# Patient Record
Sex: MALE | Race: WHITE | NOT HISPANIC OR LATINO
[De-identification: names, ages, dates, MRNs, and addresses within clinical notes are randomized per-mention and may not be internally consistent; named-entity substitution may affect disease eponyms.]

---

## 2017-04-18 ENCOUNTER — APPOINTMENT (OUTPATIENT)
Dept: UROLOGY | Facility: CLINIC | Age: 82
End: 2017-04-18

## 2017-04-18 VITALS
HEART RATE: 68 BPM | RESPIRATION RATE: 15 BRPM | TEMPERATURE: 97.8 F | DIASTOLIC BLOOD PRESSURE: 68 MMHG | SYSTOLIC BLOOD PRESSURE: 137 MMHG | BODY MASS INDEX: 24.64 KG/M2 | WEIGHT: 157 LBS | HEIGHT: 67 IN

## 2017-04-20 LAB — BACTERIA UR CULT: NORMAL

## 2017-04-26 LAB
PSA FREE FLD-MCNC: 18.4 %
PSA FREE SERPL-MCNC: 0.78 NG/ML
PSA SERPL-MCNC: 4.26 NG/ML

## 2019-02-05 ENCOUNTER — APPOINTMENT (OUTPATIENT)
Dept: UROLOGY | Facility: CLINIC | Age: 84
End: 2019-02-05
Payer: MEDICARE

## 2019-02-05 VITALS
RESPIRATION RATE: 16 BRPM | SYSTOLIC BLOOD PRESSURE: 155 MMHG | DIASTOLIC BLOOD PRESSURE: 74 MMHG | HEART RATE: 65 BPM | TEMPERATURE: 98.5 F | HEIGHT: 67 IN | BODY MASS INDEX: 25.9 KG/M2 | WEIGHT: 165 LBS

## 2019-02-05 DIAGNOSIS — Z87.898 PERSONAL HISTORY OF OTHER SPECIFIED CONDITIONS: ICD-10-CM

## 2019-02-05 DIAGNOSIS — K40.90 UNILATERAL INGUINAL HERNIA, W/OUT OBSTRUCTION OR GANGRENE, NOT SPECIFIED AS RECURRENT: ICD-10-CM

## 2019-02-05 PROCEDURE — 99213 OFFICE O/P EST LOW 20 MIN: CPT

## 2019-02-25 ENCOUNTER — APPOINTMENT (OUTPATIENT)
Dept: SURGERY | Facility: CLINIC | Age: 84
End: 2019-02-25
Payer: MEDICARE

## 2019-02-25 VITALS
HEART RATE: 79 BPM | WEIGHT: 157 LBS | SYSTOLIC BLOOD PRESSURE: 134 MMHG | HEIGHT: 67 IN | DIASTOLIC BLOOD PRESSURE: 66 MMHG | BODY MASS INDEX: 24.64 KG/M2 | TEMPERATURE: 98.2 F

## 2019-02-25 PROCEDURE — 99203 OFFICE O/P NEW LOW 30 MIN: CPT

## 2019-03-18 ENCOUNTER — OUTPATIENT (OUTPATIENT)
Dept: OUTPATIENT SERVICES | Facility: HOSPITAL | Age: 84
LOS: 1 days | End: 2019-03-18
Payer: MEDICARE

## 2019-03-18 VITALS
HEIGHT: 66 IN | HEART RATE: 84 BPM | DIASTOLIC BLOOD PRESSURE: 76 MMHG | RESPIRATION RATE: 16 BRPM | SYSTOLIC BLOOD PRESSURE: 160 MMHG | TEMPERATURE: 98 F | WEIGHT: 166.01 LBS

## 2019-03-18 DIAGNOSIS — J33.9 NASAL POLYP, UNSPECIFIED: Chronic | ICD-10-CM

## 2019-03-18 DIAGNOSIS — K40.20 BILATERAL INGUINAL HERNIA, WITHOUT OBSTRUCTION OR GANGRENE, NOT SPECIFIED AS RECURRENT: ICD-10-CM

## 2019-03-18 DIAGNOSIS — I10 ESSENTIAL (PRIMARY) HYPERTENSION: ICD-10-CM

## 2019-03-18 DIAGNOSIS — I26.99 OTHER PULMONARY EMBOLISM WITHOUT ACUTE COR PULMONALE: ICD-10-CM

## 2019-03-18 DIAGNOSIS — K40.90 UNILATERAL INGUINAL HERNIA, WITHOUT OBSTRUCTION OR GANGRENE, NOT SPECIFIED AS RECURRENT: ICD-10-CM

## 2019-03-18 LAB
ANION GAP SERPL CALC-SCNC: 13 MMO/L — SIGNIFICANT CHANGE UP (ref 7–14)
BUN SERPL-MCNC: 20 MG/DL — SIGNIFICANT CHANGE UP (ref 7–23)
CALCIUM SERPL-MCNC: 9.1 MG/DL — SIGNIFICANT CHANGE UP (ref 8.4–10.5)
CHLORIDE SERPL-SCNC: 107 MMOL/L — SIGNIFICANT CHANGE UP (ref 98–107)
CO2 SERPL-SCNC: 23 MMOL/L — SIGNIFICANT CHANGE UP (ref 22–31)
CREAT SERPL-MCNC: 1.5 MG/DL — HIGH (ref 0.5–1.3)
GLUCOSE SERPL-MCNC: 125 MG/DL — HIGH (ref 70–99)
HCT VFR BLD CALC: 39.9 % — SIGNIFICANT CHANGE UP (ref 39–50)
HGB BLD-MCNC: 12.2 G/DL — LOW (ref 13–17)
MCHC RBC-ENTMCNC: 26.8 PG — LOW (ref 27–34)
MCHC RBC-ENTMCNC: 30.6 % — LOW (ref 32–36)
MCV RBC AUTO: 87.5 FL — SIGNIFICANT CHANGE UP (ref 80–100)
NRBC # FLD: 0 K/UL — LOW (ref 25–125)
PLATELET # BLD AUTO: 232 K/UL — SIGNIFICANT CHANGE UP (ref 150–400)
PMV BLD: 10.6 FL — SIGNIFICANT CHANGE UP (ref 7–13)
POTASSIUM SERPL-MCNC: 4.1 MMOL/L — SIGNIFICANT CHANGE UP (ref 3.5–5.3)
POTASSIUM SERPL-SCNC: 4.1 MMOL/L — SIGNIFICANT CHANGE UP (ref 3.5–5.3)
RBC # BLD: 4.56 M/UL — SIGNIFICANT CHANGE UP (ref 4.2–5.8)
RBC # FLD: 15 % — HIGH (ref 10.3–14.5)
SODIUM SERPL-SCNC: 143 MMOL/L — SIGNIFICANT CHANGE UP (ref 135–145)
WBC # BLD: 6.87 K/UL — SIGNIFICANT CHANGE UP (ref 3.8–10.5)
WBC # FLD AUTO: 6.87 K/UL — SIGNIFICANT CHANGE UP (ref 3.8–10.5)

## 2019-03-18 PROCEDURE — 93010 ELECTROCARDIOGRAM REPORT: CPT

## 2019-03-18 NOTE — H&P PST ADULT - NSICDXPASTMEDICALHX_GEN_ALL_CORE_FT
PAST MEDICAL HISTORY:  Benign Essential Hypertension     DVT, lower extremity right side 20 years ago    Gout     Hyperlipidemia     Inguinal hernia bilateral    Pulmonary Embolus 20 years ago    Sciatica     Snoring PAIGE precautions -- responds affirmatively to STOP BANG questionnaire

## 2019-03-18 NOTE — H&P PST ADULT - NSANTHOSAYNRD_GEN_A_CORE
No. PAIGE screening performed.  STOP BANG Legend: 0-2 = LOW Risk; 3-4 = INTERMEDIATE Risk; 5-8 = HIGH Risk

## 2019-03-18 NOTE — H&P PST ADULT - NSICDXPASTSURGICALHX_GEN_ALL_CORE_FT
PAST SURGICAL HISTORY:  S/P Repair of Ventral Hernia PAST SURGICAL HISTORY:  Nasal polyps excision of nasal polyps    S/P Repair of Ventral Hernia

## 2019-03-18 NOTE — H&P PST ADULT - HISTORY OF PRESENT ILLNESS
This is an 86 y/o male who presents with recent finding of right inguinal hernia swelling. Visited MD and dig nosed with b/l inguinal hernia. Intervention recommended. Scheduled for lap b/l inguinal hernia repair on 4-1-19

## 2019-03-18 NOTE — H&P PST ADULT - NSICDXPROBLEM_GEN_ALL_CORE_FT
PROBLEM DIAGNOSES  Problem: Inguinal hernia  Assessment and Plan: This is an 86 y/o male who is scheduled for lap b/l inguibal hernia repair on 4-1-19  * Given preop instructions and scrub cleanser  * Instructed to take normal am dose of amlodipine, lisinopril, atenolol and nexium the am of surgery    Problem: Hypertension  Assessment and Plan: Await medical evaluation with pcp due to elevated BP at PAST and taking antihypertensives PROBLEM DIAGNOSES  Problem: Hypertension  Assessment and Plan: Await medical evaluation with pcp due to elevated BP at PAST and taking antihypertensives    Problem: Inguinal hernia  Assessment and Plan: This is an 88 y/o male who is scheduled for lap b/l inguinal hernia repair on 4-1-19  * Given preop instructions and scrub cleanser  * Instructed to take normal am dose of amlodipine, lisinopril, atenolol and Nexium the am of surgery PROBLEM DIAGNOSES  Problem: Hypertension  Assessment and Plan: Await medical evaluation with pcp due to elevated BP at PAST and taking antihypertensives  * Notified Leanna in surgeon's office    Problem: Inguinal hernia  Assessment and Plan: This is an 88 y/o male who is scheduled for lap b/l inguinal hernia repair on 4-1-19  * Given preop instructions and scrub cleanser  * Instructed to take normal am dose of amlodipine, lisinopril, atenolol and Nexium the am of surgery

## 2019-03-19 PROBLEM — I82.409 ACUTE EMBOLISM AND THROMBOSIS OF UNSPECIFIED DEEP VEINS OF UNSPECIFIED LOWER EXTREMITY: Chronic | Status: ACTIVE | Noted: 2019-03-18

## 2019-03-19 PROBLEM — K40.90 UNILATERAL INGUINAL HERNIA, WITHOUT OBSTRUCTION OR GANGRENE, NOT SPECIFIED AS RECURRENT: Chronic | Status: ACTIVE | Noted: 2019-03-18

## 2019-03-19 PROBLEM — R06.83 SNORING: Chronic | Status: ACTIVE | Noted: 2019-03-18

## 2019-03-31 ENCOUNTER — TRANSCRIPTION ENCOUNTER (OUTPATIENT)
Age: 84
End: 2019-03-31

## 2019-04-01 ENCOUNTER — APPOINTMENT (OUTPATIENT)
Dept: SURGERY | Facility: AMBULATORY SURGERY CENTER | Age: 84
End: 2019-04-01

## 2019-04-01 ENCOUNTER — OUTPATIENT (OUTPATIENT)
Dept: OUTPATIENT SERVICES | Facility: HOSPITAL | Age: 84
LOS: 1 days | Discharge: HOME | End: 2019-04-01
Payer: MEDICARE

## 2019-04-01 VITALS
TEMPERATURE: 98 F | DIASTOLIC BLOOD PRESSURE: 73 MMHG | OXYGEN SATURATION: 97 % | RESPIRATION RATE: 16 BRPM | SYSTOLIC BLOOD PRESSURE: 144 MMHG | HEIGHT: 66 IN | WEIGHT: 166.01 LBS | HEART RATE: 84 BPM

## 2019-04-01 VITALS
OXYGEN SATURATION: 96 % | SYSTOLIC BLOOD PRESSURE: 135 MMHG | HEART RATE: 68 BPM | DIASTOLIC BLOOD PRESSURE: 71 MMHG | RESPIRATION RATE: 18 BRPM | TEMPERATURE: 97 F

## 2019-04-01 DIAGNOSIS — J33.9 NASAL POLYP, UNSPECIFIED: Chronic | ICD-10-CM

## 2019-04-01 DIAGNOSIS — K40.20 BILATERAL INGUINAL HERNIA, WITHOUT OBSTRUCTION OR GANGRENE, NOT SPECIFIED AS RECURRENT: ICD-10-CM

## 2019-04-01 PROCEDURE — 49650 LAP ING HERNIA REPAIR INIT: CPT | Mod: GC

## 2019-04-01 RX ORDER — OXYCODONE AND ACETAMINOPHEN 5; 325 MG/1; MG/1
1 TABLET ORAL EVERY 4 HOURS
Qty: 0 | Refills: 0 | Status: DISCONTINUED | OUTPATIENT
Start: 2019-04-01 | End: 2019-04-01

## 2019-04-01 RX ORDER — LISINOPRIL 2.5 MG/1
1 TABLET ORAL
Qty: 0 | Refills: 0 | COMMUNITY

## 2019-04-01 RX ORDER — ESOMEPRAZOLE MAGNESIUM 40 MG/1
1 CAPSULE, DELAYED RELEASE ORAL
Qty: 0 | Refills: 0 | COMMUNITY

## 2019-04-01 RX ORDER — ATENOLOL 25 MG/1
1 TABLET ORAL
Qty: 0 | Refills: 0 | COMMUNITY

## 2019-04-01 RX ORDER — AMLODIPINE BESYLATE 2.5 MG/1
1 TABLET ORAL
Qty: 0 | Refills: 0 | COMMUNITY

## 2019-04-01 NOTE — ASU DISCHARGE PLAN (ADULT/PEDIATRIC) - ASU DC SPECIAL INSTRUCTIONSFT
You may take tylenol for pain. If you have breakthrough pain despite tylenol, you may take a Percocet ( a prescription was sent to [facility] at [facility]). Do not exceed total 3g of tylenol in 1 day, and keep in mind that there is tylenol in Percocet.

## 2019-04-01 NOTE — ASU DISCHARGE PLAN (ADULT/PEDIATRIC) - CARE PROVIDER_API CALL
Julio Tuttle)  ColonRectal Surgery; Surgery  [address] [place] [address]  Phone: [phone]  Fax: [phone]  Follow Up Time:

## 2019-04-01 NOTE — ASU DISCHARGE PLAN (ADULT/PEDIATRIC) - BATHING
Shower only/OK to start showering 48 hours after surgery. No soaking in water/bath. Pat dry. Do not submerge in water/Shower only/OK to start showering 48 hours after surgery. No soaking in water/bath. Pat dry.

## 2019-04-01 NOTE — ASU DISCHARGE PLAN (ADULT/PEDIATRIC) - ACTIVITY LEVEL
No strenuous activity for 1 week. No heavy lifting for 1 month./No excercise/No heavy lifting/No sports/gym

## 2019-04-02 ENCOUNTER — OUTPATIENT (OUTPATIENT)
Dept: OUTPATIENT SERVICES | Facility: HOSPITAL | Age: 84
LOS: 1 days | End: 2019-04-02
Payer: MEDICARE

## 2019-04-02 ENCOUNTER — APPOINTMENT (OUTPATIENT)
Dept: UROLOGY | Facility: CLINIC | Age: 84
End: 2019-04-02
Payer: MEDICARE

## 2019-04-02 DIAGNOSIS — J33.9 NASAL POLYP, UNSPECIFIED: Chronic | ICD-10-CM

## 2019-04-02 PROCEDURE — 51702 INSERT TEMP BLADDER CATH: CPT

## 2019-04-02 PROCEDURE — 99213 OFFICE O/P EST LOW 20 MIN: CPT | Mod: 25

## 2019-04-03 NOTE — LETTER BODY
[FreeTextEntry1] : Sejal Antonio MD\par [address] [place] [address]\par [facility] [place] [address]\par \par Dear Dr. Antonio,\par \par Hank Shaw returns to the office today. He is an 87-year-old man who I had seen last in February 2019. He presented to me at that time with a bump in his right groin region. It was causing him some discomfort for several months. I found him to have an inguinal hernia and I sent him for a consultation with the general surgeon. Since that time he did undergo hernia surgery just yesterday by laparoscopic approach. He developed urinary retention overnight and is here in the office today unable to urinate. The patient reports being uncomfortable and feeling distended. He has been unable to pass more than a few drops of urine since last night. He denies fevers or chills. He has no nausea or vomiting. He denies any other abdominal complaints.\par \par Examination today shows a distended bladder which is palpable in the suprapubic region. Residual bladder scan performed today shows almost 700 mL. This being the case, I did place a catheter into the bladder which returned clear yellow urine. He became immediately much more comfortable and I left the catheter to leg bag drainage. The patient will continue tamsulosin and I will see him back in a few days to remove the catheter after his bladder has had a chance to rest following this overdistention.\par \par Please do not hesitate to contact me with any questions or concerns.\par \par Sincerely,\par \par \par \par \par Philip Rios MD, FACS\par  of Urology\par [employer] [facility] [employer]\par \par [facility]\par Director of Robotics and Minimally Invasive Surgery\par [address] [place] [address]\[place] [address]\par P: [phone]\par F: [phone]\par [email or web address]\par

## 2019-04-04 ENCOUNTER — APPOINTMENT (OUTPATIENT)
Dept: UROLOGY | Facility: CLINIC | Age: 84
End: 2019-04-04
Payer: MEDICARE

## 2019-04-04 DIAGNOSIS — R33.8 OTHER RETENTION OF URINE: ICD-10-CM

## 2019-04-04 PROCEDURE — 99213 OFFICE O/P EST LOW 20 MIN: CPT | Mod: 25

## 2019-04-04 PROCEDURE — 51798 US URINE CAPACITY MEASURE: CPT

## 2019-04-04 NOTE — ASSESSMENT
[FreeTextEntry1] : Voiding trial was performed in the office today. 300 mL sterile water were instilled into the bladder through the existing catheter and the catheter was then removed. The patient was able to urinate to completion with 300 mL voided and postvoid residual bladder scan showing 2 mL. This being the case, the catheter was left out and the patient was discharged from the office with instructions to void on a timed basis and to continue the tamsulosin.\par \par I expect a retention was most likely related to the anesthetic effect and although he will continue the tamsulosin for a baseline BPH, he will unlikely have a residual tissue with retention moving forward unless there is another anesthetic administered at some point in time or for any other medical issue is encountered.\par \par

## 2019-04-04 NOTE — HISTORY OF PRESENT ILLNESS
[FreeTextEntry1] : Hank Shaw returns to the office today. He is an 87-year-old man is recently status post laparoscopic hernia repair surgery. The night of the procedure he developed urinary retention and presented to this office the following day with about 700 mL residual in the bladder. A catheter was placed. He is now back in the office today for a voiding trial.\par \par The patient reports the catheter to drain well over the last few days. He denies any bleeding or blood clot in the urine. Although the catheter has some expected discomfort, he feels much more comfortable with the catheter in place as opposed to the bladder distention and discomfort he was feeling couple of days ago. He has also been taking tamsulosin.

## 2019-04-05 DIAGNOSIS — R33.8 OTHER RETENTION OF URINE: ICD-10-CM

## 2019-04-08 ENCOUNTER — APPOINTMENT (OUTPATIENT)
Dept: SURGERY | Facility: CLINIC | Age: 84
End: 2019-04-08
Payer: MEDICARE

## 2019-04-08 VITALS
BODY MASS INDEX: 25.9 KG/M2 | DIASTOLIC BLOOD PRESSURE: 70 MMHG | SYSTOLIC BLOOD PRESSURE: 153 MMHG | WEIGHT: 165 LBS | HEIGHT: 67 IN | TEMPERATURE: 98.2 F | HEART RATE: 78 BPM

## 2019-04-08 PROCEDURE — 99024 POSTOP FOLLOW-UP VISIT: CPT

## 2019-04-15 ENCOUNTER — APPOINTMENT (OUTPATIENT)
Dept: SURGERY | Facility: CLINIC | Age: 84
End: 2019-04-15
Payer: MEDICARE

## 2019-04-15 VITALS
HEIGHT: 67 IN | HEART RATE: 81 BPM | DIASTOLIC BLOOD PRESSURE: 78 MMHG | SYSTOLIC BLOOD PRESSURE: 137 MMHG | BODY MASS INDEX: 25.9 KG/M2 | WEIGHT: 165 LBS | TEMPERATURE: 98.1 F

## 2019-04-15 DIAGNOSIS — Z09 ENCOUNTER FOR FOLLOW-UP EXAMINATION AFTER COMPLETED TREATMENT FOR CONDITIONS OTHER THAN MALIGNANT NEOPLASM: ICD-10-CM

## 2019-04-15 PROCEDURE — 99024 POSTOP FOLLOW-UP VISIT: CPT

## 2019-04-15 NOTE — ASSESSMENT
[FreeTextEntry1] : Patient is doing extremely well. No pain.\par \par \par incisions are c/d/i and healing well\par \par \par \par f/u prn

## 2019-11-04 NOTE — ASU PREOP CHECKLIST - SURGICAL CONSENT
New referral for kidney mass.  Referred by Violet Butcher to Dr. Jenkins.  Left message for patient to return call on voice mail to discuss appointment.  History notes says for IR ablation vs surgery??   done

## 2023-06-27 ENCOUNTER — APPOINTMENT (OUTPATIENT)
Dept: PAIN MANAGEMENT | Facility: CLINIC | Age: 88
End: 2023-06-27
Payer: MEDICARE

## 2023-06-27 VITALS — DIASTOLIC BLOOD PRESSURE: 73 MMHG | SYSTOLIC BLOOD PRESSURE: 163 MMHG | HEART RATE: 73 BPM

## 2023-06-27 PROCEDURE — 62323 NJX INTERLAMINAR LMBR/SAC: CPT

## 2023-06-27 PROCEDURE — 99204 OFFICE O/P NEW MOD 45 MIN: CPT | Mod: 25

## 2023-06-27 RX ORDER — MELOXICAM 7.5 MG/1
7.5 TABLET ORAL
Refills: 0 | Status: ACTIVE | COMMUNITY

## 2023-06-27 RX ORDER — DONEPEZIL HYDROCHLORIDE 10 MG/1
10 TABLET, FILM COATED ORAL
Refills: 0 | Status: ACTIVE | COMMUNITY

## 2023-06-27 RX ORDER — METOPROLOL SUCCINATE 25 MG/1
25 TABLET, EXTENDED RELEASE ORAL
Refills: 0 | Status: ACTIVE | COMMUNITY

## 2023-06-27 RX ADMIN — TRIAMCINOLONE ACETONIDE MG/ML: 10 INJECTION, SUSPENSION INTRA-ARTICULAR; INTRALESIONAL at 00:00

## 2023-06-27 RX ADMIN — IOHEXOL MG/ML: 180 INJECTION INTRAVENOUS at 00:00

## 2023-06-27 RX ADMIN — Medication %: at 00:00

## 2023-06-27 NOTE — ASSESSMENT
[FreeTextEntry1] : >> Imaging and Other Studies\par \par I personally reviewed the relevant imaging.  Discussed and explained to patient the likely source of pathology and pain.  Questions answered. MRI \par \par >> Therapy and Other Modalities\par \par pending PT\par \par >> Medications\par \par acetaminophen 650mg q8h prn pain (caution <3g daily)\par  \par >> Interventions\par \par Significant component of back and leg pain likely secondary to lumbar spinal stenosis demonstrated on MRI LS.  Will schedule L4-5 interlaminar epidural steroid injection r/b/a discussed\par \par \par \par >> Consults\par \par >> Discussion of Risks/Benefits/Alternatives\par \par 	>Regarding any scheduled procedures:\par \par I have discussed in detail with the patient that any interventional pain procedure is associated with potential risks.  The procedure may include an injection of steroids and potentially other medications (local anesthetic and normal saline) into the epidural space or surrounding tissue of the spine.  There are significant risks of this procedure which include and are not limited to infection, bleeding, worsening pain, dural puncture leading to postdural puncture headache, nerve damage, spinal cord injury, paralysis, stroke, and death.  \par \par There is a chance that the procedure does not improve their pain.  \par \par There are risks associated with the steroid being absorbed into the body systemically.  These include dysphoria, difficulty sleeping, mood swings and personality changes.  Premenopausal women may notice an irregularity in her menstrual cycle for 2-3 months following the injection.  Steroids can specifically affect patients with hypertension, diabetes, and peptic ulcers.  The procedure may cause a temporary increase in blood pressure and blood pressure, and may adversely affect a peptic ulcer.  Other, more rare complications, include avascular necrosis of joints, glaucoma and worsening of osteoporosis. \par \par I have discussed the risks of the procedure at length with the patient, and the potential benefits of pain relief.  I have offered alternatives to the procedure.  All questions were answered.  \par \par The patient expressed understanding and wishes to proceed with the procedure.\par \par 	>Regarding COVID19 Pandemic: \par \par Any planned interventional pain procedure are scheduled because further delay may cause harm or negative outcome to patient.  The goal in performing this procedure is to avoid deterioration of function, emergency room visits (which increases exposure) and reliance on opioids.  \par \par r/b/a discussed with patient, lack of evidence to conclusively determine whether pain management procedures have any positive or negative impact on the possibility of raulito the virus and/or development of any sequelae. \par \par Patient counselled regarding timing steroid based intervention 2 weeks before or after COVID-19 vaccine administration to avoid any interaction or affect on efficacy of vaccination\par \par Patient demonstrates understanding\par \par Informed patient that risks associated with the COVID-19 infection.  Informed patient steps taken to limit the risks.  We are implementing safety precautions and following protocols consistent with the CDC and state recommendations. All patients and staff will be checked for fever or signs of illness upon entry to the facility. We will limit our steroid dose to the lowest effective therapeutic dose or in some cases steroids will not be injected at all. \par \par Patient agrees to proceed\par \par >> Conclusion\par \par The above diagnosis and treatment plan is medically reasonable and necessary based on the patient encounter \par There were no barriers to communication.\par Informed patient that I would be available for any additional questions.\par Patient was instructed to call with any worsening symptoms including severe pain, new numbness/weakness, or changes in the bowel/bladder function. \par Discussed role of nsaids in pain management and all relevant risks, if patient is continuing to require after 4 weeks the patient should f/u for alternative treatment. \par Instructed patient to maintain pain diary to monitor pain level, mobility, and function.\par \par \par

## 2023-06-27 NOTE — PROCEDURE
[FreeTextEntry1] : INTERLAMINAR EPIDURAL STEROID INJECTION\par \par The patient was placed in the prone position on the fluoroscopic table with a pillow under the abdomen.  Routine monitors were applied.  A surgical timeout was performed.  The back was prepped and draped in the usual sterile fashion and sterile technique was adhered to throughout the entire procedure.\par \par The [L4-5] was identified under fluroscopic guidance.  The vertebral body end plates were aligned and the spinous process was midline between the lateral processes.  The skin and subcutaneous tissues were infiltrated with [1% Lidocaine].  After adequate local anesthesia a ["20g Tuohy"] needle was inserted at [L4-5]   and aimed towards RIGHT side.  \par \par Using a loss of resistance to air technique the epidural space was identified.  After negative aspiration for heme and CSF, 1cc Omnipaque N180 contrast dye was injected.  Epidural spread of contrast was confirmed in the AP and lateral views.  The patient was injected with a mixture of 80mg kenalog with 1cc lidocaine 1% and 2cc of PF normal saline in incremental amounts.\par \par The patient tolerated the procedure well.  There was no neurological deficit post procedure.  The patient went to recovery room in stable condition.  Discharge instructions were given to the patient.\par

## 2023-06-27 NOTE — HISTORY OF PRESENT ILLNESS
[FreeTextEntry1] : HPI\par \par  \par \par Mr. EVELYN DE SOUZA is a 89 year M with pmhx of HTN, gout. C/o worsening lower back and right leg pain. No relief with Tylenol. Has been in PT for the past three weeks with no relief in symptoms. Denies any additional weakness, numbness, bowel/bladder dysfunction.\par \par \par Previous and current pain medications/doses/effects: Tylenol\par \par  \par \par Previous Pain Treatments: Physical Therapy\par \par  \par \par Previous Pain Injections: none\par \par  \par \par Previous Diagnostic Studies/Images:\par \par  \par 6/8/23 EXAM: MRI LUMBAR SPINE WITHOUT CONTRAST\par \par HISTORY: Lower back pain.\par \par TECHNIQUE: Multiplanar, multi-sequential MRI of the lumbar spine was obtained on a 3T scanner using a standard protocol.\par \par COMPARISON: None available.\par \par FINDINGS\par \par Image quality is degraded by patient motion artifact particularly severe on the axial pulse sequences\par \par For purposes of this dictation, the last well-formed disc space will be labeled L5-S1.\par \par OSSEOUS STRUCTURES: Vertebral body heights are preserved. No marrow edema or destructive marrow infiltrative process.\par \par ALIGNMENT: Normal lumbar lordosis is preserved. No significant scoliosis. There is grade 1 retrolisthesis of L2 on L3 and L3 on L4. No spondylolysis within the limitations of MRI.\par \par SPINAL CORD AND CONUS MEDULLARIS: Normal signal. The conus medullaris terminates at the level of T12-L4 and exhibits normal signal.\par \par PARASPINAL AND INTRA-ABDOMINAL SOFT TISSUES: Unremarkable.\par \par INCLUDED THORACIC SPINE AND SACRUM: Unremarkable.\par \par DISCS: There is mild to moderate disc space narrowing at L3-4 and L4-5. There is multilevel disc desiccation.\par \par The following axial levels are imaged and detailed below:\par \par L1-L2: There is mild annular bulging and a central annular fissure. There is no spinal canal or foraminal stenosis.\par \par L2-L3: There is an inferiorly extruded right paracentral disc herniation which is superimposed on moderate diffuse disc bulging and mild facet arthrosis. There is moderate spinal canal stenosis. There is impingement of the traversing right L3 nerve root in the right subarticular recess. There is mild to moderate foraminal stenosis bilaterally.\par \par L3-L4: There is moderate disc/osteophyte and facet arthrosis contributing to mild to moderate spinal canal stenosis and contact of the traversing L4 nerve roots bilaterally. There is moderate foraminal stenosis bilaterally.\par \par L4-L5: There is moderate disc/osteophyte and mild to moderate facet arthrosis. There is mild ventral thecal sac impingement and contact of the traversing L5 nerve root sheaths bilaterally. There is moderate foraminal stenosis bilaterally.\par \par L5-S1: There is a superiorly directed central disc herniation and mild to moderate facet arthrosis. There is contact of the traversing S1 nerve root sheaths bilaterally. There is mild right foraminal encroachment.\par \par IMPRESSION: MRI of the lumbar spine demonstrates:\par \par ???1.Motion-degraded examination.\par \par 2 L2-L3 Infereioly extruded right paracentral disc herniation superimposed on moderate diffuse disc bulging and mild facet arthrosis contributing moderate spinal canal stenosis, Impingement of the traversing right L3 nerve root, and mild to moderate foraminal stenosis bilaterally.\par \par 3. L3-4 moderate disc/osteophyte and facet arthrosis contributing to mild to moderate spinal canal stenosis, contact Of the traversing L4 nerve roots bilaterally, and moderate foraminal stenosis bilaterally.\par \par 4. L4-L5 Moderate disc/osteophyte and mild to moderate facet arthrosis contributing to mild ventral thecal sac impingement, contact of the traversing L5 nerve root sheaths bilaterally, and moderate foraminal stenosis bilaterally. 5, L5-S1: Superiorly directed centrat disc herntation and miltd to moderate facet arthrosis contributing to contact of the traversing S1 nerve root sheaths bilaterally, and mild right foraminal encroachment.\par \par 6. Grade 1 retrolisthesis of L2 on L3 and L3 on L4.\par \par \par \par \par \par

## 2023-07-11 ENCOUNTER — APPOINTMENT (OUTPATIENT)
Dept: PAIN MANAGEMENT | Facility: CLINIC | Age: 88
End: 2023-07-11
Payer: MEDICARE

## 2023-07-11 PROCEDURE — 99214 OFFICE O/P EST MOD 30 MIN: CPT | Mod: 95

## 2023-07-11 NOTE — ASSESSMENT
[FreeTextEntry1] : >> Imaging and Other Studies\par \par I personally reviewed the relevant imaging.  Discussed and explained to patient the likely source of pathology and pain.  Questions answered. MRI \par \par >> Therapy and Other Modalities\par \par pending PT\par \par >> Medications\par \par acetaminophen 650mg q8h prn pain (caution <3g daily)\par  \par >> Interventions\par \par Significant component of back and leg pain likely secondary to lumbar spinal stenosis demonstrated on MRI LS.  sp L4-5 interlaminar epidural steroid injection with mild improvement\par \par Lumbar radiculopathy secondary to disc herniation and facet arthropathy demonstrated on MRI LS, refractory to conservative treatments, will schedule /RIGHT L2-3 and L3-4 transforaminal epidural steroid injection r/b/a discussed\par \par \par \par \par >> Consults\par \par >> Discussion of Risks/Benefits/Alternatives\par \par 	>Regarding any scheduled procedures:\par \par I have discussed in detail with the patient that any interventional pain procedure is associated with potential risks.  The procedure may include an injection of steroids and potentially other medications (local anesthetic and normal saline) into the epidural space or surrounding tissue of the spine.  There are significant risks of this procedure which include and are not limited to infection, bleeding, worsening pain, dural puncture leading to postdural puncture headache, nerve damage, spinal cord injury, paralysis, stroke, and death.  \par \par There is a chance that the procedure does not improve their pain.  \par \par There are risks associated with the steroid being absorbed into the body systemically.  These include dysphoria, difficulty sleeping, mood swings and personality changes.  Premenopausal women may notice an irregularity in her menstrual cycle for 2-3 months following the injection.  Steroids can specifically affect patients with hypertension, diabetes, and peptic ulcers.  The procedure may cause a temporary increase in blood pressure and blood pressure, and may adversely affect a peptic ulcer.  Other, more rare complications, include avascular necrosis of joints, glaucoma and worsening of osteoporosis. \par \par I have discussed the risks of the procedure at length with the patient, and the potential benefits of pain relief.  I have offered alternatives to the procedure.  All questions were answered.  \par \par The patient expressed understanding and wishes to proceed with the procedure.\par \par 	>Regarding COVID19 Pandemic: \par \par Any planned interventional pain procedure are scheduled because further delay may cause harm or negative outcome to patient.  The goal in performing this procedure is to avoid deterioration of function, emergency room visits (which increases exposure) and reliance on opioids.  \par \par r/b/a discussed with patient, lack of evidence to conclusively determine whether pain management procedures have any positive or negative impact on the possibility of raulito the virus and/or development of any sequelae. \par \par Patient counselled regarding timing steroid based intervention 2 weeks before or after COVID-19 vaccine administration to avoid any interaction or affect on efficacy of vaccination\par \par Patient demonstrates understanding\par \par Informed patient that risks associated with the COVID-19 infection.  Informed patient steps taken to limit the risks.  We are implementing safety precautions and following protocols consistent with the CDC and state recommendations. All patients and staff will be checked for fever or signs of illness upon entry to the facility. We will limit our steroid dose to the lowest effective therapeutic dose or in some cases steroids will not be injected at all. \par \par Patient agrees to proceed\par \par >> Conclusion\par \par The above diagnosis and treatment plan is medically reasonable and necessary based on the patient encounter \par There were no barriers to communication.\par Informed patient that I would be available for any additional questions.\par Patient was instructed to call with any worsening symptoms including severe pain, new numbness/weakness, or changes in the bowel/bladder function. \par Discussed role of nsaids in pain management and all relevant risks, if patient is continuing to require after 4 weeks the patient should f/u for alternative treatment. \par Instructed patient to maintain pain diary to monitor pain level, mobility, and function.\par \par I explained to patient benefits and limitation of TeleMedicine visits\par \par Patient understands that limitations include inability to perform comprehensive physical exam, which may lead to potential diagnostic inconsistencies.  \par \par Any scheduled procedures are based on history, imaging and limited physical exam performed on TeleHealth visit.  If necessary, additional focal physical exam will be performed on date of procedure\par \par Patient understands that diagnosis and treatment may be limited by these inconsistencies and patient agrees to proceed with care plan\par \par \par \par

## 2023-07-11 NOTE — PHYSICAL EXAM
[de-identified] : \par Constitutional: Normal, well developed, no acute distress on audio/video examination\par Eyes: Symmetric, External structures on video examination\par ENT: Lips, mucosa and tongue normal on video examination\par Oropharynx: Lips normal, symmetric, no external lesions appreciated appreciated on video examination\par Respiratory: Non-labored breathing, no audible wheezes appreciated on audio/video examination\par Vascular: No cyanosis appreciated or edema appreciated on video examination\par GI:  no jaundice appreciated on video examination\par Neurovascular: CN grossly intact on video/audio examination, alert\par MSK: Normal muscle bulk on video examination\par

## 2023-07-11 NOTE — HISTORY OF PRESENT ILLNESS
[Home] : at home, [unfilled] , at the time of the visit. [Medical Office: ([facility])___] : at the medical office located in  [Verbal consent obtained from patient] : the patient, [unfilled] [FreeTextEntry1] : Interval Note:\par sp L4-5 interlaminar epidural steroid injection without significant improvement in right anterior leg pain.  Pain is so bad that patient finds it difficult to perform adls and ambulate. \par . Denies any additional weakness, numbness, bowel/bladder dysfunction.  \par \par \par \par HPI\par \par  \par \par Mr. EVELYN DE SOUZA is a 89 year M with pmhx of HTN, gout. C/o worsening lower back and right leg pain. No relief with Tylenol. Has been in PT for the past three weeks with no relief in symptoms. Denies any additional weakness, numbness, bowel/bladder dysfunction.\par \par \par Previous and current pain medications/doses/effects: Tylenol\par \par  \par \par Previous Pain Treatments: Physical Therapy\par \par  \par \par Previous Pain Injections: \par \par L4-5 interlaminar epidural steroid injection 6/27/23\par \par  \par \par Previous Diagnostic Studies/Images:\par \par  \par 6/8/23 EXAM: MRI LUMBAR SPINE WITHOUT CONTRAST\par \par HISTORY: Lower back pain.\par \par TECHNIQUE: Multiplanar, multi-sequential MRI of the lumbar spine was obtained on a 3T scanner using a standard protocol.\par \par COMPARISON: None available.\par \par FINDINGS\par \par Image quality is degraded by patient motion artifact particularly severe on the axial pulse sequences\par \par For purposes of this dictation, the last well-formed disc space will be labeled L5-S1.\par \par OSSEOUS STRUCTURES: Vertebral body heights are preserved. No marrow edema or destructive marrow infiltrative process.\par \par ALIGNMENT: Normal lumbar lordosis is preserved. No significant scoliosis. There is grade 1 retrolisthesis of L2 on L3 and L3 on L4. No spondylolysis within the limitations of MRI.\par \par SPINAL CORD AND CONUS MEDULLARIS: Normal signal. The conus medullaris terminates at the level of T12-L4 and exhibits normal signal.\par \par PARASPINAL AND INTRA-ABDOMINAL SOFT TISSUES: Unremarkable.\par \par INCLUDED THORACIC SPINE AND SACRUM: Unremarkable.\par \par DISCS: There is mild to moderate disc space narrowing at L3-4 and L4-5. There is multilevel disc desiccation.\par \par The following axial levels are imaged and detailed below:\par \par L1-L2: There is mild annular bulging and a central annular fissure. There is no spinal canal or foraminal stenosis.\par \par L2-L3: There is an inferiorly extruded right paracentral disc herniation which is superimposed on moderate diffuse disc bulging and mild facet arthrosis. There is moderate spinal canal stenosis. There is impingement of the traversing right L3 nerve root in the right subarticular recess. There is mild to moderate foraminal stenosis bilaterally.\par \par L3-L4: There is moderate disc/osteophyte and facet arthrosis contributing to mild to moderate spinal canal stenosis and contact of the traversing L4 nerve roots bilaterally. There is moderate foraminal stenosis bilaterally.\par \par L4-L5: There is moderate disc/osteophyte and mild to moderate facet arthrosis. There is mild ventral thecal sac impingement and contact of the traversing L5 nerve root sheaths bilaterally. There is moderate foraminal stenosis bilaterally.\par \par L5-S1: There is a superiorly directed central disc herniation and mild to moderate facet arthrosis. There is contact of the traversing S1 nerve root sheaths bilaterally. There is mild right foraminal encroachment.\par \par IMPRESSION: MRI of the lumbar spine demonstrates:\par \par ???1.Motion-degraded examination.\par \par 2 L2-L3 Infereioly extruded right paracentral disc herniation superimposed on moderate diffuse disc bulging and mild facet arthrosis contributing moderate spinal canal stenosis, Impingement of the traversing right L3 nerve root, and mild to moderate foraminal stenosis bilaterally.\par \par 3. L3-4 moderate disc/osteophyte and facet arthrosis contributing to mild to moderate spinal canal stenosis, contact Of the traversing L4 nerve roots bilaterally, and moderate foraminal stenosis bilaterally.\par \par 4. L4-L5 Moderate disc/osteophyte and mild to moderate facet arthrosis contributing to mild ventral thecal sac impingement, contact of the traversing L5 nerve root sheaths bilaterally, and moderate foraminal stenosis bilaterally. 5, L5-S1: Superiorly directed centrat disc herntation and miltd to moderate facet arthrosis contributing to contact of the traversing S1 nerve root sheaths bilaterally, and mild right foraminal encroachment.\par \par 6. Grade 1 retrolisthesis of L2 on L3 and L3 on L4.\par \par \par \par \par \par

## 2023-07-14 ENCOUNTER — APPOINTMENT (OUTPATIENT)
Dept: PAIN MANAGEMENT | Facility: HOSPITAL | Age: 88
End: 2023-07-14

## 2023-07-14 ENCOUNTER — TRANSCRIPTION ENCOUNTER (OUTPATIENT)
Age: 88
End: 2023-07-14

## 2023-08-02 ENCOUNTER — APPOINTMENT (OUTPATIENT)
Dept: PAIN MANAGEMENT | Facility: CLINIC | Age: 88
End: 2023-08-02
Payer: MEDICARE

## 2023-08-02 VITALS
BODY MASS INDEX: 25.9 KG/M2 | SYSTOLIC BLOOD PRESSURE: 136 MMHG | HEART RATE: 84 BPM | DIASTOLIC BLOOD PRESSURE: 73 MMHG | HEIGHT: 67 IN | WEIGHT: 165 LBS | OXYGEN SATURATION: 96 %

## 2023-08-02 PROCEDURE — 62323 NJX INTERLAMINAR LMBR/SAC: CPT

## 2023-08-02 PROCEDURE — 99214 OFFICE O/P EST MOD 30 MIN: CPT | Mod: 25

## 2023-08-02 RX ORDER — LIDOCAINE 5% 700 MG/1
5 PATCH TOPICAL
Qty: 30 | Refills: 1 | Status: ACTIVE | COMMUNITY
Start: 2023-08-02 | End: 1900-01-01

## 2023-08-02 RX ADMIN — IOHEXOL 0 MG/ML: 180 INJECTION INTRAVENOUS at 00:00

## 2023-08-02 RX ADMIN — TRIAMCINOLONE ACETONIDE 0 MG/ML: 80 INJECTION, SUSPENSION INTRA-ARTICULAR; INTRAMUSCULAR at 00:00

## 2023-08-02 RX ADMIN — Medication %: at 00:00

## 2023-08-02 NOTE — HISTORY OF PRESENT ILLNESS
[Back Pain] : back pain [___ mths] : [unfilled] month(s) ago [Constant] : constant [3] : a minimum pain level of 3/10 [9] : a maximum pain level of 9/10 [Sharp] : sharp [Shooting] : shooting [Sitting] : sitting [Standing] : standing [Walking] : walking [Laying] : laying [Medications] : medications [Heat] : heat [FreeTextEntry1] : Interval Note:  sp  /RIGHT L2-3 and L3-4 transforaminal epidural steroid injection 7/14/23 with transient improvement but reports that he is again struggling with back and right leg pain.  Pain is so bad that patient finds it difficult to perform adls and ambulate . Denies any additional weakness, numbness, bowel/bladder dysfunction.      HPI     Mr. EVELYN DE SOUZA is a 89 year M with pmhx of HTN, gout. C/o worsening lower back and right leg pain. No relief with Tylenol. Has been in PT for the past three weeks with no relief in symptoms. Denies any additional weakness, numbness, bowel/bladder dysfunction.   Previous and current pain medications/doses/effects: Tylenol     Previous Pain Treatments: Physical Therapy     Previous Pain Injections:  /RIGHT L2-3 and L3-4 transforaminal epidural steroid injection 7/14/23 L4-5 interlaminar epidural steroid injection 6/27/23     Previous Diagnostic Studies/Images:    6/8/23 EXAM: MRI LUMBAR SPINE WITHOUT CONTRAST  HISTORY: Lower back pain.  TECHNIQUE: Multiplanar, multi-sequential MRI of the lumbar spine was obtained on a 3T scanner using a standard protocol.  COMPARISON: None available.  FINDINGS  Image quality is degraded by patient motion artifact particularly severe on the axial pulse sequences  For purposes of this dictation, the last well-formed disc space will be labeled L5-S1.  OSSEOUS STRUCTURES: Vertebral body heights are preserved. No marrow edema or destructive marrow infiltrative process.  ALIGNMENT: Normal lumbar lordosis is preserved. No significant scoliosis. There is grade 1 retrolisthesis of L2 on L3 and L3 on L4. No spondylolysis within the limitations of MRI.  SPINAL CORD AND CONUS MEDULLARIS: Normal signal. The conus medullaris terminates at the level of T12-L4 and exhibits normal signal.  PARASPINAL AND INTRA-ABDOMINAL SOFT TISSUES: Unremarkable.  INCLUDED THORACIC SPINE AND SACRUM: Unremarkable.  DISCS: There is mild to moderate disc space narrowing at L3-4 and L4-5. There is multilevel disc desiccation.  The following axial levels are imaged and detailed below:  L1-L2: There is mild annular bulging and a central annular fissure. There is no spinal canal or foraminal stenosis.  L2-L3: There is an inferiorly extruded right paracentral disc herniation which is superimposed on moderate diffuse disc bulging and mild facet arthrosis. There is moderate spinal canal stenosis. There is impingement of the traversing right L3 nerve root in the right subarticular recess. There is mild to moderate foraminal stenosis bilaterally.  L3-L4: There is moderate disc/osteophyte and facet arthrosis contributing to mild to moderate spinal canal stenosis and contact of the traversing L4 nerve roots bilaterally. There is moderate foraminal stenosis bilaterally.  L4-L5: There is moderate disc/osteophyte and mild to moderate facet arthrosis. There is mild ventral thecal sac impingement and contact of the traversing L5 nerve root sheaths bilaterally. There is moderate foraminal stenosis bilaterally.  L5-S1: There is a superiorly directed central disc herniation and mild to moderate facet arthrosis. There is contact of the traversing S1 nerve root sheaths bilaterally. There is mild right foraminal encroachment.  IMPRESSION: MRI of the lumbar spine demonstrates:  ???1.Motion-degraded examination.  2 L2-L3 Infereioly extruded right paracentral disc herniation superimposed on moderate diffuse disc bulging and mild facet arthrosis contributing moderate spinal canal stenosis, Impingement of the traversing right L3 nerve root, and mild to moderate foraminal stenosis bilaterally.  3. L3-4 moderate disc/osteophyte and facet arthrosis contributing to mild to moderate spinal canal stenosis, contact Of the traversing L4 nerve roots bilaterally, and moderate foraminal stenosis bilaterally.  4. L4-L5 Moderate disc/osteophyte and mild to moderate facet arthrosis contributing to mild ventral thecal sac impingement, contact of the traversing L5 nerve root sheaths bilaterally, and moderate foraminal stenosis bilaterally. 5, L5-S1: Superiorly directed centrat disc herntation and miltd to moderate facet arthrosis contributing to contact of the traversing S1 nerve root sheaths bilaterally, and mild right foraminal encroachment.  6. Grade 1 retrolisthesis of L2 on L3 and L3 on L4.       [FreeTextEntry7] : lumbar right down to extremity

## 2023-08-02 NOTE — PHYSICAL EXAM
[Normal muscle bulk without asymmetry] : normal muscle bulk without asymmetry [] : Motor: [NL] : normal and symmetric bilaterally [Normal] : Normal affect [Facet Tenderness] : no facet tenderness

## 2023-08-02 NOTE — ASSESSMENT
[FreeTextEntry1] : >> Imaging and Other Studies  I personally reviewed the relevant imaging.  Discussed and explained to patient the likely source of pathology and pain.  Questions answered. MRI   >> Therapy and Other Modalities  pending PT  >> Medications  acetaminophen 650mg q8h prn pain (caution <3g daily)   >> Interventions  Significant component of back and leg pain likely secondary to lumbar spinal stenosis demonstrated on MRI LS.  sp L4-5 interlaminar epidural steroid injection with improvement  Lumbar radiculopathy secondary to disc herniation and facet arthropathy demonstrated on MRI LS, refractory to conservative treatments, sp /RIGHT L2-3 and L3-4 transforaminal epidural steroid injection  given efficacy of previous intervention that is >80% improvement in pain and improved ability to perform adls, and return of pain despite conservative treatment, will schedule repeat L2-3 interlaminar epidural steroid injection r/b/a discussed     >> Consults  >> Discussion of Risks/Benefits/Alternatives  	>Regarding any scheduled procedures:  I have discussed in detail with the patient that any interventional pain procedure is associated with potential risks.  The procedure may include an injection of steroids and potentially other medications (local anesthetic and normal saline) into the epidural space or surrounding tissue of the spine.  There are significant risks of this procedure which include and are not limited to infection, bleeding, worsening pain, dural puncture leading to postdural puncture headache, nerve damage, spinal cord injury, paralysis, stroke, and death.    There is a chance that the procedure does not improve their pain.    There are risks associated with the steroid being absorbed into the body systemically.  These include dysphoria, difficulty sleeping, mood swings and personality changes.  Premenopausal women may notice an irregularity in her menstrual cycle for 2-3 months following the injection.  Steroids can specifically affect patients with hypertension, diabetes, and peptic ulcers.  The procedure may cause a temporary increase in blood pressure and blood pressure, and may adversely affect a peptic ulcer.  Other, more rare complications, include avascular necrosis of joints, glaucoma and worsening of osteoporosis.   I have discussed the risks of the procedure at length with the patient, and the potential benefits of pain relief.  I have offered alternatives to the procedure.  All questions were answered.    The patient expressed understanding and wishes to proceed with the procedure.  	>Regarding COVID19 Pandemic:   Any planned interventional pain procedure are scheduled because further delay may cause harm or negative outcome to patient.  The goal in performing this procedure is to avoid deterioration of function, emergency room visits (which increases exposure) and reliance on opioids.    r/b/a discussed with patient, lack of evidence to conclusively determine whether pain management procedures have any positive or negative impact on the possibility of raulito the virus and/or development of any sequelae.   Patient counselled regarding timing steroid based intervention 2 weeks before or after COVID-19 vaccine administration to avoid any interaction or affect on efficacy of vaccination  Patient demonstrates understanding  Informed patient that risks associated with the COVID-19 infection.  Informed patient steps taken to limit the risks.  We are implementing safety precautions and following protocols consistent with the CDC and state recommendations. All patients and staff will be checked for fever or signs of illness upon entry to the facility. We will limit our steroid dose to the lowest effective therapeutic dose or in some cases steroids will not be injected at all.   Patient agrees to proceed  >> Conclusion  The above diagnosis and treatment plan is medically reasonable and necessary based on the patient encounter  There were no barriers to communication. Informed patient that I would be available for any additional questions. Patient was instructed to call with any worsening symptoms including severe pain, new numbness/weakness, or changes in the bowel/bladder function.  Discussed role of nsaids in pain management and all relevant risks, if patient is continuing to require after 4 weeks the patient should f/u for alternative treatment.  Instructed patient to maintain pain diary to monitor pain level, mobility, and function.

## 2023-08-02 NOTE — PROCEDURE
[FreeTextEntry1] : INTERLAMINAR EPIDURAL STEROID INJECTION  The patient was placed in the prone position on the fluoroscopic table with a pillow under the abdomen.  Routine monitors were applied.  A surgical timeout was performed.  The back was prepped and draped in the usual sterile fashion and sterile technique was adhered to throughout the entire procedure.  The [L2-3] was identified under fluroscopic guidance.  The vertebral body end plates were aligned and the spinous process was midline between the lateral processes.  The skin and subcutaneous tissues were infiltrated with [1% Lidocaine].  After adequate local anesthesia a ["20g Tuohy"] needle was inserted at [L2-3]   and aimed towards the affected side.    Using a loss of resistance to air technique the epidural space was identified.  After negative aspiration for heme and CSF, 1cc Omnipaque N180 contrast dye was injected.  Epidural spread of contrast was confirmed in the AP and lateral views.  The patient was injected with a mixture of 80mg kenalog with 1cc lidocaine 1% and 2cc of PF normal saline in incremental amounts.  The patient tolerated the procedure well.  There was no neurological deficit post procedure.  The patient went to recovery room in stable condition.  Discharge instructions were given to the patient.

## 2023-08-17 ENCOUNTER — APPOINTMENT (OUTPATIENT)
Dept: PAIN MANAGEMENT | Facility: CLINIC | Age: 88
End: 2023-08-17
Payer: MEDICARE

## 2023-08-17 PROCEDURE — 99214 OFFICE O/P EST MOD 30 MIN: CPT | Mod: 95

## 2023-08-17 NOTE — HISTORY OF PRESENT ILLNESS
[Home] : at home, [unfilled] , at the time of the visit. [Medical Office: ([facility])___] : at the medical office located in  [Verbal consent obtained from patient] : the patient, [unfilled] [FreeTextEntry1] : Interval Note: sp L2-3 interlaminar epidural steroid injection 8/2/23 with 90% improvement in back pain.  Patient is doing much better and participating in PT.  Not requiring analgesics.  Patient reports that he has had some bouts of nighttime fecal incontinence but not associated temporally with procedure.   . Denies any additional weakness, numbness, bowel/bladder dysfunction.      HPI     Mr. EVELYN DE SOUZA is a 89 year M with pmhx of HTN, gout. C/o worsening lower back and right leg pain. No relief with Tylenol. Has been in PT for the past three weeks with no relief in symptoms. Denies any additional weakness, numbness, bowel/bladder dysfunction.   Previous and current pain medications/doses/effects: Tylenol     Previous Pain Treatments: Physical Therapy     Previous Pain Injections:  /RIGHT L2-3 and L3-4 transforaminal epidural steroid injection 7/14/23 L4-5 interlaminar epidural steroid injection 6/27/23     Previous Diagnostic Studies/Images:    6/8/23 EXAM: MRI LUMBAR SPINE WITHOUT CONTRAST  HISTORY: Lower back pain.  TECHNIQUE: Multiplanar, multi-sequential MRI of the lumbar spine was obtained on a 3T scanner using a standard protocol.  COMPARISON: None available.  FINDINGS  Image quality is degraded by patient motion artifact particularly severe on the axial pulse sequences  For purposes of this dictation, the last well-formed disc space will be labeled L5-S1.  OSSEOUS STRUCTURES: Vertebral body heights are preserved. No marrow edema or destructive marrow infiltrative process.  ALIGNMENT: Normal lumbar lordosis is preserved. No significant scoliosis. There is grade 1 retrolisthesis of L2 on L3 and L3 on L4. No spondylolysis within the limitations of MRI.  SPINAL CORD AND CONUS MEDULLARIS: Normal signal. The conus medullaris terminates at the level of T12-L4 and exhibits normal signal.  PARASPINAL AND INTRA-ABDOMINAL SOFT TISSUES: Unremarkable.  INCLUDED THORACIC SPINE AND SACRUM: Unremarkable.  DISCS: There is mild to moderate disc space narrowing at L3-4 and L4-5. There is multilevel disc desiccation.  The following axial levels are imaged and detailed below:  L1-L2: There is mild annular bulging and a central annular fissure. There is no spinal canal or foraminal stenosis.  L2-L3: There is an inferiorly extruded right paracentral disc herniation which is superimposed on moderate diffuse disc bulging and mild facet arthrosis. There is moderate spinal canal stenosis. There is impingement of the traversing right L3 nerve root in the right subarticular recess. There is mild to moderate foraminal stenosis bilaterally.  L3-L4: There is moderate disc/osteophyte and facet arthrosis contributing to mild to moderate spinal canal stenosis and contact of the traversing L4 nerve roots bilaterally. There is moderate foraminal stenosis bilaterally.  L4-L5: There is moderate disc/osteophyte and mild to moderate facet arthrosis. There is mild ventral thecal sac impingement and contact of the traversing L5 nerve root sheaths bilaterally. There is moderate foraminal stenosis bilaterally.  L5-S1: There is a superiorly directed central disc herniation and mild to moderate facet arthrosis. There is contact of the traversing S1 nerve root sheaths bilaterally. There is mild right foraminal encroachment.  IMPRESSION: MRI of the lumbar spine demonstrates:  ???1.Motion-degraded examination.  2 L2-L3 Infereioly extruded right paracentral disc herniation superimposed on moderate diffuse disc bulging and mild facet arthrosis contributing moderate spinal canal stenosis, Impingement of the traversing right L3 nerve root, and mild to moderate foraminal stenosis bilaterally.  3. L3-4 moderate disc/osteophyte and facet arthrosis contributing to mild to moderate spinal canal stenosis, contact Of the traversing L4 nerve roots bilaterally, and moderate foraminal stenosis bilaterally.  4. L4-L5 Moderate disc/osteophyte and mild to moderate facet arthrosis contributing to mild ventral thecal sac impingement, contact of the traversing L5 nerve root sheaths bilaterally, and moderate foraminal stenosis bilaterally. 5, L5-S1: Superiorly directed centrat disc herntation and miltd to moderate facet arthrosis contributing to contact of the traversing S1 nerve root sheaths bilaterally, and mild right foraminal encroachment.  6. Grade 1 retrolisthesis of L2 on L3 and L3 on L4.       [Back Pain] : back pain [___ mths] : [unfilled] month(s) ago [Constant] : constant [3] : a minimum pain level of 3/10 [9] : a maximum pain level of 9/10 [Sharp] : sharp [Shooting] : shooting [Sitting] : sitting [Standing] : standing [Walking] : walking [Laying] : laying [Medications] : medications [Heat] : heat [FreeTextEntry7] : lumbar right down to extremity

## 2023-08-17 NOTE — ASSESSMENT
[FreeTextEntry1] : >> Imaging and Other Studies  I personally reviewed the relevant imaging.  Discussed and explained to patient the likely source of pathology and pain.  Questions answered. MRI   >> Therapy and Other Modalities  continue pT  >> Medications  acetaminophen 650mg q8h prn pain (caution <3g daily)   >> Interventions  Significant component of back and leg pain likely secondary to lumbar spinal stenosis demonstrated on MRI LS.  sp L4-5 interlaminar epidural steroid injection with improvement  Lumbar radiculopathy secondary to disc herniation and facet arthropathy demonstrated on MRI LS, refractory to conservative treatments, sp /RIGHT L2-3 and L3-4 transforaminal epidural steroid injection  given efficacy of previous intervention that is >80% improvement in pain and improved ability to perform adls, and return of pain despite conservative treatment, sp repeat L2-3 interlaminar epidural steroid injection with significant improvement.     >> Consults  to see GI regarding fecal incontinence   >> Discussion of Risks/Benefits/Alternatives  	>Regarding any scheduled procedures:  I have discussed in detail with the patient that any interventional pain procedure is associated with potential risks.  The procedure may include an injection of steroids and potentially other medications (local anesthetic and normal saline) into the epidural space or surrounding tissue of the spine.  There are significant risks of this procedure which include and are not limited to infection, bleeding, worsening pain, dural puncture leading to postdural puncture headache, nerve damage, spinal cord injury, paralysis, stroke, and death.    There is a chance that the procedure does not improve their pain.    There are risks associated with the steroid being absorbed into the body systemically.  These include dysphoria, difficulty sleeping, mood swings and personality changes.  Premenopausal women may notice an irregularity in her menstrual cycle for 2-3 months following the injection.  Steroids can specifically affect patients with hypertension, diabetes, and peptic ulcers.  The procedure may cause a temporary increase in blood pressure and blood pressure, and may adversely affect a peptic ulcer.  Other, more rare complications, include avascular necrosis of joints, glaucoma and worsening of osteoporosis.   I have discussed the risks of the procedure at length with the patient, and the potential benefits of pain relief.  I have offered alternatives to the procedure.  All questions were answered.    The patient expressed understanding and wishes to proceed with the procedure.  	>Regarding COVID19 Pandemic:   Any planned interventional pain procedure are scheduled because further delay may cause harm or negative outcome to patient.  The goal in performing this procedure is to avoid deterioration of function, emergency room visits (which increases exposure) and reliance on opioids.    r/b/a discussed with patient, lack of evidence to conclusively determine whether pain management procedures have any positive or negative impact on the possibility of raulito the virus and/or development of any sequelae.   Patient counselled regarding timing steroid based intervention 2 weeks before or after COVID-19 vaccine administration to avoid any interaction or affect on efficacy of vaccination  Patient demonstrates understanding  Informed patient that risks associated with the COVID-19 infection.  Informed patient steps taken to limit the risks.  We are implementing safety precautions and following protocols consistent with the CDC and state recommendations. All patients and staff will be checked for fever or signs of illness upon entry to the facility. We will limit our steroid dose to the lowest effective therapeutic dose or in some cases steroids will not be injected at all.   Patient agrees to proceed  >> Conclusion  The above diagnosis and treatment plan is medically reasonable and necessary based on the patient encounter  There were no barriers to communication. Informed patient that I would be available for any additional questions. Patient was instructed to call with any worsening symptoms including severe pain, new numbness/weakness, or changes in the bowel/bladder function.  Discussed role of nsaids in pain management and all relevant risks, if patient is continuing to require after 4 weeks the patient should f/u for alternative treatment.  Instructed patient to maintain pain diary to monitor pain level, mobility, and function.

## 2023-09-13 ENCOUNTER — APPOINTMENT (OUTPATIENT)
Dept: PAIN MANAGEMENT | Facility: CLINIC | Age: 88
End: 2023-09-13
Payer: MEDICARE

## 2023-09-13 VITALS
WEIGHT: 165 LBS | DIASTOLIC BLOOD PRESSURE: 80 MMHG | SYSTOLIC BLOOD PRESSURE: 174 MMHG | BODY MASS INDEX: 25.9 KG/M2 | HEIGHT: 67 IN

## 2023-09-13 PROCEDURE — 64483 NJX AA&/STRD TFRM EPI L/S 1: CPT | Mod: RT

## 2023-09-13 PROCEDURE — 99214 OFFICE O/P EST MOD 30 MIN: CPT | Mod: 25

## 2023-09-13 PROCEDURE — 64484 NJX AA&/STRD TFRM EPI L/S EA: CPT | Mod: RT

## 2023-09-26 ENCOUNTER — APPOINTMENT (OUTPATIENT)
Dept: PAIN MANAGEMENT | Facility: CLINIC | Age: 88
End: 2023-09-26
Payer: MEDICARE

## 2023-09-26 DIAGNOSIS — M54.16 RADICULOPATHY, LUMBAR REGION: ICD-10-CM

## 2023-09-26 DIAGNOSIS — G89.4 CHRONIC PAIN SYNDROME: ICD-10-CM

## 2023-09-26 DIAGNOSIS — M79.2 NEURALGIA AND NEURITIS, UNSPECIFIED: ICD-10-CM

## 2023-09-26 DIAGNOSIS — M48.061 SPINAL STENOSIS, LUMBAR REGION WITHOUT NEUROGENIC CLAUDICATION: ICD-10-CM

## 2023-09-26 DIAGNOSIS — M48.062 SPINAL STENOSIS, LUMBAR REGION WITH NEUROGENIC CLAUDICATION: ICD-10-CM

## 2023-09-26 PROCEDURE — 99214 OFFICE O/P EST MOD 30 MIN: CPT | Mod: 95

## 2023-10-18 ENCOUNTER — RX RENEWAL (OUTPATIENT)
Age: 88
End: 2023-10-18

## 2023-10-18 RX ORDER — GABAPENTIN 100 MG/1
100 CAPSULE ORAL
Qty: 90 | Refills: 0 | Status: ACTIVE | COMMUNITY
Start: 2023-08-02 | End: 1900-01-01

## 2024-08-13 NOTE — PHYSICAL EXAM
[SLR] : positive straight leg raise [] : Motor: [NL] : normal and symmetric bilaterally [de-identified] : Constitutional: Normal, well developed, no acute distress\par Eyes: Symmetric, External structures \par Oropharynx: Lips normal, symmetric, no external lesions appreciated\par Respiratory: Non-labored breathing, no audible wheezes\par Cardiac: Pulse palpated, no tachycardia\par Vascular: No cyanosis appreciated, no edema in bilateral lower extremities\par GI: Nondistended, no jaundice appreciated\par Neurovascular: CN2-12 grossly intact, Alert and oriented\par MSK: Normal muscle bulk, 5/5 Motor strength B/L in LE\par \par  Progressing: [] Met: [x] Not Met: [] Adjusted     Therapist goals for Patient:   Short Term Goals: To be achieved in: 2 weeks  Independent in HEP and progression per patient tolerance, in order to progress toward full function and prevent re-injury.               Status: [] Progressing: [x] Met: [] Not Met: [] Adjusted  Patient will have a decrease in pain to 0/10 to help  facilitate improvement in movement, function, and ADLs as indicated by functional deficits.              Status: [] Progressing: [x] Met: [] Not Met: [] Adjusted     Long Term Goals: To be achieved in: 4-6 weeks  Disability index score of 52 or more for the Hardy Balance Scale to assist with return top prior level of function.                     Status: [] Progressing: [] Met: [x] Not Met: [] Adjusted  Improve knee AROM Flexion to WFL to allow for proper joint functioning as indicated by patients functional deficits.  Status: [] Progressing: [x] Met: [] Not Met: [] Adjusted  Pt to improve strength to 4+/5 or better of quadriceps to allow for proper muscle and joint use in functional mobility, ADLs and prior level of function  Status: [] Progressing: [x] Met: [] Not Met: [] Adjusted  Patient will return to putting shoes/socks on without increased symptoms or restriction to work towards return to prior level of function.  Status: [] Progressing: [x] Met: [] Not Met: [] Adjusted  Pt will be able to go up and down 4 8 inch steps safely and without assist or supervision to mimic being able to get in and out of tractor.                                                                                                              Status: [] Progressing: [] Met: [x] Not Met: [] Adjusted [x] Comment: attempted 8/13/24, required SBA supervision 2/2 balance leading with each leg today    Overall Progression Towards Functional goals/ Treatment Progress Update:  [] Patient is progressing as expected towards functional goals listed.    [x] Progression is slowed